# Patient Record
Sex: FEMALE | Race: WHITE | NOT HISPANIC OR LATINO | URBAN - NONMETROPOLITAN AREA
[De-identification: names, ages, dates, MRNs, and addresses within clinical notes are randomized per-mention and may not be internally consistent; named-entity substitution may affect disease eponyms.]

---

## 2018-08-21 PROBLEM — H25.13: Noted: 2018-08-21

## 2018-08-21 PROBLEM — H52.03: Noted: 2018-08-21

## 2019-08-28 ENCOUNTER — IMPORTED ENCOUNTER (OUTPATIENT)
Dept: URBAN - NONMETROPOLITAN AREA CLINIC 1 | Facility: CLINIC | Age: 64
End: 2019-08-28

## 2019-08-28 PROCEDURE — 92014 COMPRE OPH EXAM EST PT 1/>: CPT

## 2019-08-28 NOTE — PATIENT DISCUSSION
Cataract OU-Not yet surgical. -Reviewed symptoms of advancing cataract growth such as glare and halos and decreased vision.-Continue to monitor for now. Pt will notify us if any new symptoms develop. MILD PROGRESSION

## 2020-08-31 ENCOUNTER — IMPORTED ENCOUNTER (OUTPATIENT)
Dept: URBAN - NONMETROPOLITAN AREA CLINIC 1 | Facility: CLINIC | Age: 65
End: 2020-08-31

## 2020-08-31 PROCEDURE — 92014 COMPRE OPH EXAM EST PT 1/>: CPT

## 2021-07-15 NOTE — PATIENT DISCUSSION
POSTERIOR CAPSULAR FIBROSIS, OU:  VISUALLY SIGNIFICANT. OPTION OF YAG LASER VERSUS UPDATING GLASSES VERSUS FOLLOWING DISCUSSED. RBA'S DISCUSSED, PATIENT UNDERSTANDS AND DESIRES YAG LASER TO INCREASE VISION FOR WATCHING TV AND TO REDUCE GLARE IN ORDER TO DRIVE SAFELY AT NIGHT. SCHEDULE YAG LASER OD THEN OS.

## 2021-09-01 ENCOUNTER — IMPORTED ENCOUNTER (OUTPATIENT)
Dept: URBAN - NONMETROPOLITAN AREA CLINIC 1 | Facility: CLINIC | Age: 66
End: 2021-09-01

## 2021-09-01 PROCEDURE — 92014 COMPRE OPH EXAM EST PT 1/>: CPT

## 2021-09-01 NOTE — PATIENT DISCUSSION
Cataract OU +progression-Not yet surgical. -Reviewed symptoms of advancing cataract growth such as glare and halos and decreased vision.-Continue to monitor for now. Pt will notify us if any new symptoms develop.

## 2021-09-23 NOTE — PATIENT DISCUSSION
MODERATE DRY EYES OU: RECOMMENDED ARTIFICIAL TEARS BID - QID/PRN AND NIGHTLY LUBRICATING OINTMENT OR GEL. RECOMMENDED WARM COMPRESSES AND LID HYGIENE. ADD XIIDRA BID OU (GAVE SAMPLE, PT TO CALL FOR RX IF NOTING IMPROVEMENT), CONSIDER PUNCTAL PLUGS OR RESTASIS NEXT VISIT IF NOT RESPONSIVE OR IF SYMPTOMS PERSIST. RETURN FOR FOLLOW-UP AS SCHEDULED OR SOONER IF SYMPTOMS WORSEN.

## 2021-11-17 ENCOUNTER — IMPORTED ENCOUNTER (OUTPATIENT)
Dept: URBAN - NONMETROPOLITAN AREA CLINIC 1 | Facility: CLINIC | Age: 66
End: 2021-11-17

## 2021-11-17 NOTE — PATIENT DISCUSSION
[address] OU +progression-Not yet surgical. -Reviewed symptoms of advancing cataract growth such as glare and halos and decreased vision.-Continue to monitor for now. Pt will notify us if any new symptoms develop.

## 2022-04-09 ASSESSMENT — VISUAL ACUITY
OS_SC: 20/40
OD_CC: J1
OD_SC: 20/30
OS_SC: 20/40
OS_CC: J1
OD_SC: 20/40-2
OU_CC: J1
OS_CC: J5
OS_SC: 20/40-1
OD_SC: 20/40
OD_CC: J5

## 2022-04-09 ASSESSMENT — TONOMETRY
OD_IOP_MMHG: 17
OD_IOP_MMHG: 12
OD_IOP_MMHG: 14
OS_IOP_MMHG: 16
OS_IOP_MMHG: 12
OS_IOP_MMHG: 14

## 2022-07-21 NOTE — PATIENT DISCUSSION
DRY EYE SYNDROME OU: RX ARTIFICIAL TEARS AS NEEDED TO INCREASE COMFORT OU. IF SYMPTOMS PERSIST CONSIDER PUNCTAL PLUGS. ESCRIBE E MYCIN QHS OU.

## 2022-09-07 ENCOUNTER — COMPREHENSIVE EXAM (OUTPATIENT)
Dept: RURAL CLINIC 1 | Facility: CLINIC | Age: 67
End: 2022-09-07

## 2022-09-07 DIAGNOSIS — H25.13: ICD-10-CM

## 2022-09-07 PROCEDURE — 92014 COMPRE OPH EXAM EST PT 1/>: CPT

## 2022-09-07 ASSESSMENT — VISUAL ACUITY
OS_BAT: 20/30
OD_CC: 20/50
OD_PH: 20/40+2
OS_CC: 20/20
OD_BAT: 20/40

## 2022-09-07 ASSESSMENT — TONOMETRY
OD_IOP_MMHG: 16
OS_IOP_MMHG: 16

## 2023-02-27 NOTE — PATIENT DISCUSSION
Manual Manifest - Daily wearOD-1.00+2.5015+2.504 [id number]/40 -&nbsp;SN &nbsp; &nbsp; dch 5-Fu Pregnancy And Lactation Text: This medication is Pregnancy Category X and contraindicated in pregnancy and in women who may become pregnant. It is unknown if this medication is excreted in breast milk.

## 2023-07-10 ENCOUNTER — FOLLOW UP (OUTPATIENT)
Dept: RURAL CLINIC 1 | Facility: CLINIC | Age: 68
End: 2023-07-10

## 2023-07-10 DIAGNOSIS — H25.13: ICD-10-CM

## 2023-07-10 DIAGNOSIS — H16.223: ICD-10-CM

## 2023-07-10 PROCEDURE — 92014 COMPRE OPH EXAM EST PT 1/>: CPT

## 2023-07-10 ASSESSMENT — VISUAL ACUITY
OD_BAT: 20/30
OD_CC: 20/30
OS_AM: 20/30
OS_CC: 20/40
OU_CC: 20/40
OS_BAT: 20/40

## 2023-07-10 ASSESSMENT — TONOMETRY
OS_IOP_MMHG: 17
OD_IOP_MMHG: 17

## 2023-07-31 ENCOUNTER — CONSULTATION/EVALUATION (OUTPATIENT)
Dept: RURAL CLINIC 1 | Facility: CLINIC | Age: 68
End: 2023-07-31

## 2023-07-31 DIAGNOSIS — H25.13: ICD-10-CM

## 2023-07-31 PROCEDURE — 99214 OFFICE O/P EST MOD 30 MIN: CPT

## 2023-07-31 ASSESSMENT — VISUAL ACUITY
OS_BAT: 20/70
OS_CC: 20/30-1
OD_BAT: 20/70
OU_CC: 20/30-1
OS_CC: 20/40
OD_CC: 20/30-1
OD_CC: 20/40
OS_PH: 20/25
OU_CC: 20/40

## 2023-07-31 ASSESSMENT — TONOMETRY
OS_IOP_MMHG: 16
OD_IOP_MMHG: 16

## 2023-09-25 ENCOUNTER — PRE-OP/H&P (OUTPATIENT)
Dept: RURAL CLINIC 1 | Facility: CLINIC | Age: 68
End: 2023-09-25

## 2023-09-25 VITALS
HEART RATE: 81 BPM | DIASTOLIC BLOOD PRESSURE: 62 MMHG | BODY MASS INDEX: 21 KG/M2 | WEIGHT: 123 LBS | SYSTOLIC BLOOD PRESSURE: 97 MMHG | HEIGHT: 64 IN

## 2023-09-25 DIAGNOSIS — Z01.818: ICD-10-CM

## 2023-09-25 DIAGNOSIS — H25.13: ICD-10-CM

## 2023-09-25 PROCEDURE — 99499 UNLISTED E&M SERVICE: CPT

## 2023-10-10 ENCOUNTER — SURGERY/PROCEDURE (OUTPATIENT)
Dept: URBAN - METROPOLITAN AREA SURGERY 3 | Facility: SURGERY | Age: 68
End: 2023-10-10

## 2023-10-10 DIAGNOSIS — H25.11: ICD-10-CM

## 2023-10-10 PROCEDURE — 66984 XCAPSL CTRC RMVL W/O ECP: CPT

## 2023-10-10 PROCEDURE — 68841 INSJ RX ELUT IMPLT LAC CANAL: CPT | Mod: 51,RT

## 2023-10-12 ENCOUNTER — POST-OP (OUTPATIENT)
Dept: RURAL CLINIC 1 | Facility: CLINIC | Age: 68
End: 2023-10-12

## 2023-10-12 DIAGNOSIS — Z96.1: ICD-10-CM

## 2023-10-12 PROCEDURE — 99024 POSTOP FOLLOW-UP VISIT: CPT

## 2023-10-12 ASSESSMENT — VISUAL ACUITY
OU_SC: 20/40+1
OD_SC: 20/40

## 2023-10-12 ASSESSMENT — TONOMETRY: OD_IOP_MMHG: 16

## 2023-10-16 ENCOUNTER — POST OP/EVAL OF SECOND EYE (OUTPATIENT)
Dept: RURAL CLINIC 1 | Facility: CLINIC | Age: 68
End: 2023-10-16

## 2023-10-16 DIAGNOSIS — H25.12: ICD-10-CM

## 2023-10-16 DIAGNOSIS — Z96.1: ICD-10-CM

## 2023-10-16 PROCEDURE — 99213 OFFICE O/P EST LOW 20 MIN: CPT | Mod: 24

## 2023-10-16 ASSESSMENT — TONOMETRY
OS_IOP_MMHG: 16
OD_IOP_MMHG: 16

## 2023-10-16 ASSESSMENT — VISUAL ACUITY
OS_SC: 20/200
OD_SC: 20/40

## 2023-10-24 ENCOUNTER — SURGERY/PROCEDURE (OUTPATIENT)
Dept: URBAN - METROPOLITAN AREA SURGERY 3 | Facility: SURGERY | Age: 68
End: 2023-10-24

## 2023-10-24 DIAGNOSIS — H25.12: ICD-10-CM

## 2023-10-24 PROCEDURE — 68841 INSJ RX ELUT IMPLT LAC CANAL: CPT | Mod: 51,LT,79,LT

## 2023-10-24 PROCEDURE — 66984 XCAPSL CTRC RMVL W/O ECP: CPT | Mod: 79,LT

## 2023-10-25 ENCOUNTER — POST-OP (OUTPATIENT)
Dept: RURAL CLINIC 1 | Facility: CLINIC | Age: 68
End: 2023-10-25

## 2023-10-25 DIAGNOSIS — Z96.1: ICD-10-CM

## 2023-10-25 PROCEDURE — 99024 POSTOP FOLLOW-UP VISIT: CPT

## 2023-10-25 ASSESSMENT — VISUAL ACUITY: OS_SC: 20/50

## 2023-10-25 ASSESSMENT — TONOMETRY: OS_IOP_MMHG: 15

## 2023-11-02 ENCOUNTER — POST-OP (OUTPATIENT)
Dept: RURAL CLINIC 1 | Facility: CLINIC | Age: 68
End: 2023-11-02

## 2023-11-02 DIAGNOSIS — Z96.1: ICD-10-CM

## 2023-11-02 PROCEDURE — 99024 POSTOP FOLLOW-UP VISIT: CPT

## 2023-11-02 ASSESSMENT — VISUAL ACUITY
OU_SC: 20/40
OS_SC: 20/70
OD_SC: 20/40

## 2023-11-02 ASSESSMENT — TONOMETRY
OD_IOP_MMHG: 16
OS_IOP_MMHG: 16

## 2023-11-28 ENCOUNTER — POST-OP (OUTPATIENT)
Dept: RURAL CLINIC 1 | Facility: CLINIC | Age: 68
End: 2023-11-28

## 2023-11-28 DIAGNOSIS — Z96.1: ICD-10-CM

## 2023-11-28 PROCEDURE — 99024 POSTOP FOLLOW-UP VISIT: CPT

## 2023-11-28 ASSESSMENT — TONOMETRY
OS_IOP_MMHG: 14
OD_IOP_MMHG: 13

## 2023-11-28 ASSESSMENT — VISUAL ACUITY
OS_SC: 20/70
OD_SC: 20/40
OS_PH: 20/30
OD_PH: 20/30

## 2024-01-02 ENCOUNTER — POST-OP (OUTPATIENT)
Dept: RURAL CLINIC 1 | Facility: CLINIC | Age: 69
End: 2024-01-02

## 2024-01-02 DIAGNOSIS — H16.223: ICD-10-CM

## 2024-01-02 DIAGNOSIS — Z96.1: ICD-10-CM

## 2024-01-02 PROCEDURE — 99024 POSTOP FOLLOW-UP VISIT: CPT

## 2024-01-02 ASSESSMENT — TONOMETRY
OD_IOP_MMHG: 14
OS_IOP_MMHG: 14

## 2024-01-02 ASSESSMENT — VISUAL ACUITY
OD_SC: 20/25+2
OS_SC: 20/80

## 2024-02-02 ENCOUNTER — EMERGENCY VISIT (OUTPATIENT)
Dept: RURAL CLINIC 1 | Facility: CLINIC | Age: 69
End: 2024-02-02

## 2024-02-02 DIAGNOSIS — Z96.1: ICD-10-CM

## 2024-02-02 DIAGNOSIS — H16.223: ICD-10-CM

## 2024-02-02 PROCEDURE — 99213 OFFICE O/P EST LOW 20 MIN: CPT

## 2024-02-02 ASSESSMENT — VISUAL ACUITY
OU_SC: 20/40+2
OD_SC: 20/50-1
OS_SC: 20/60

## 2024-04-10 ENCOUNTER — FOLLOW UP (OUTPATIENT)
Dept: RURAL CLINIC 1 | Facility: CLINIC | Age: 69
End: 2024-04-10

## 2024-04-10 DIAGNOSIS — Z96.1: ICD-10-CM

## 2024-04-10 DIAGNOSIS — H16.223: ICD-10-CM

## 2024-04-10 DIAGNOSIS — H18.513: ICD-10-CM

## 2024-04-10 PROCEDURE — 92014 COMPRE OPH EXAM EST PT 1/>: CPT

## 2024-04-10 ASSESSMENT — TONOMETRY
OS_IOP_MMHG: 18
OD_IOP_MMHG: 18

## 2024-04-10 ASSESSMENT — VISUAL ACUITY
OS_SC: 20/80
OU_SC: 20/40
OD_SC: 20/50+1

## 2024-06-13 ENCOUNTER — EMERGENCY VISIT (OUTPATIENT)
Dept: RURAL CLINIC 1 | Facility: CLINIC | Age: 69
End: 2024-06-13

## 2024-06-13 DIAGNOSIS — H18.513: ICD-10-CM

## 2024-06-13 DIAGNOSIS — H43.813: ICD-10-CM

## 2024-06-13 PROCEDURE — 92014 COMPRE OPH EXAM EST PT 1/>: CPT

## 2024-06-13 ASSESSMENT — VISUAL ACUITY
OD_SC: 20/50
OD_SC: 20/100
OU_SC: 20/100
OU_SC: 20/40
OS_SC: 20/100
OS_SC: 20/60

## 2024-06-13 ASSESSMENT — TONOMETRY
OD_IOP_MMHG: 10
OS_IOP_MMHG: 11
OS_IOP_MMHG: 12
OD_IOP_MMHG: 11

## 2025-04-14 ENCOUNTER — COMPREHENSIVE EXAM (OUTPATIENT)
Age: 70
End: 2025-04-14

## 2025-04-14 DIAGNOSIS — H43.813: ICD-10-CM

## 2025-04-14 DIAGNOSIS — Z96.1: ICD-10-CM

## 2025-04-14 DIAGNOSIS — H18.513: ICD-10-CM

## 2025-04-14 DIAGNOSIS — H26.492: ICD-10-CM

## 2025-04-14 DIAGNOSIS — H16.223: ICD-10-CM

## 2025-04-14 PROCEDURE — 92014 COMPRE OPH EXAM EST PT 1/>: CPT
